# Patient Record
Sex: FEMALE | Race: WHITE | NOT HISPANIC OR LATINO | URBAN - METROPOLITAN AREA
[De-identification: names, ages, dates, MRNs, and addresses within clinical notes are randomized per-mention and may not be internally consistent; named-entity substitution may affect disease eponyms.]

---

## 2019-10-18 ENCOUNTER — OFFICE VISIT - HEALTHEAST (OUTPATIENT)
Dept: FAMILY MEDICINE | Facility: CLINIC | Age: 49
End: 2019-10-18

## 2019-10-18 DIAGNOSIS — J01.90 ACUTE SINUSITIS WITH SYMPTOMS > 10 DAYS: ICD-10-CM

## 2019-10-18 RX ORDER — CETIRIZINE HYDROCHLORIDE 10 MG/1
10 TABLET ORAL
Status: SHIPPED | COMMUNITY
Start: 2013-02-22

## 2019-10-18 RX ORDER — FLUOXETINE 40 MG/1
CAPSULE ORAL
Status: SHIPPED | COMMUNITY
Start: 2008-01-24

## 2019-10-18 RX ORDER — BUPROPION HYDROCHLORIDE 100 MG/1
TABLET, EXTENDED RELEASE ORAL
Refills: 0 | Status: SHIPPED | COMMUNITY
Start: 2019-09-29

## 2021-05-24 ENCOUNTER — RECORDS - HEALTHEAST (OUTPATIENT)
Dept: ADMINISTRATIVE | Facility: CLINIC | Age: 51
End: 2021-05-24

## 2021-06-02 NOTE — PROGRESS NOTES
Assessment/Plan:         Irlanda was seen today for neck pain and ear pain.    Diagnoses and all orders for this visit:    Acute sinusitis with symptoms > 10 days: acutely worsening sinus symptoms after a recent prolonged dental discomfort and rhinorrhea. Suspect left frontal sinus infection. Given the pain she is having and the prolonged period of symptoms, will treat with augmentin. Discussed concerning symptoms for which to return. Continue to push fluids, rest, OTC analgesics.   -     amoxicillin-clavulanate (AUGMENTIN) 875-125 mg per tablet; Take 1 tablet by mouth 2 (two) times a day for 10 days.            Plan of care was discussed with the patient and/or guardian. They verbalize understanding of the treatment options and plan of care.    Sara Edgar       Subjective:        Irlanda Fry is a 49 y.o. female who presents for face, ear and neck pain on the left side.   Has been having some symptoms for almost 4 weeks as she got a dental implant done on the left side. Figured she was grinding and didn't think too much of the face pressure and ear pain until her forehead, left neck started to hurt and the ear pain got worse. The more sever symptoms have been present now for 1 week, getting worse instead of better which is what brought her here. She is using nasal spray and tylenol which aren't really helping.  No fever, chills, nausea.   She has a history of recurrent sinus infections - this feels similar, though more localized on the left side.   Not a smoker.  Past medical history, meds and allergies were reviewed, up to date in Epic.         Objective:       Blood pressure 118/79, pulse 80, resp. rate 18, weight (!) 226 lb (102.5 kg), SpO2 98 %.   Gen: tired appearing, no distress.  HENT: pain with palpation of the left SCM muscle, but no swelling or mass. No mastoid tenderness. She has shotty diffuse anterior and posterior cervical lymphadenopathy on the left. TMs bulging with clear fluid bilaterally.  She has severe tenderness with palpation over the left frontal sinus, less over the left maxillary. Her teeth and implant look normal without swelling or erythema.

## 2021-06-03 VITALS
SYSTOLIC BLOOD PRESSURE: 118 MMHG | WEIGHT: 226 LBS | RESPIRATION RATE: 18 BRPM | HEART RATE: 80 BPM | DIASTOLIC BLOOD PRESSURE: 79 MMHG | OXYGEN SATURATION: 98 %